# Patient Record
Sex: MALE | Race: WHITE
[De-identification: names, ages, dates, MRNs, and addresses within clinical notes are randomized per-mention and may not be internally consistent; named-entity substitution may affect disease eponyms.]

---

## 2018-03-17 ENCOUNTER — HOSPITAL ENCOUNTER (EMERGENCY)
Dept: HOSPITAL 17 - PHEFT | Age: 63
Discharge: HOME | End: 2018-03-17
Payer: COMMERCIAL

## 2018-03-17 VITALS — BODY MASS INDEX: 29.4 KG/M2 | WEIGHT: 194.01 LBS | HEIGHT: 68 IN

## 2018-03-17 VITALS
DIASTOLIC BLOOD PRESSURE: 74 MMHG | TEMPERATURE: 98.5 F | SYSTOLIC BLOOD PRESSURE: 141 MMHG | RESPIRATION RATE: 16 BRPM | HEART RATE: 75 BPM | OXYGEN SATURATION: 97 %

## 2018-03-17 DIAGNOSIS — F02.80: ICD-10-CM

## 2018-03-17 DIAGNOSIS — L03.012: Primary | ICD-10-CM

## 2018-03-17 DIAGNOSIS — E11.9: ICD-10-CM

## 2018-03-17 DIAGNOSIS — G31.09: ICD-10-CM

## 2018-03-17 PROCEDURE — 87205 SMEAR GRAM STAIN: CPT

## 2018-03-17 PROCEDURE — 87070 CULTURE OTHR SPECIMN AEROBIC: CPT

## 2018-03-17 PROCEDURE — 10060 I&D ABSCESS SIMPLE/SINGLE: CPT

## 2018-03-17 NOTE — PD
HPI


Chief Complaint:  Skin Problem


Time Seen by Provider:  13:35


Travel History


International Travel<30 days:  No


Contact w/Intl Traveler<30days:  No


Traveled to known affect area:  No





History of Present Illness


HPI


62-year-old male with history of early-onset dementia, frontal temporal lobe 

dementia presents to the emergency department with his wife and son at bedside 

for evaluation of infection to the left second finger.  He states he noticed 

this this morning.  He is not currently on antibiotics.  The patient does not 

contribute to history and physical due to his dementia history.  They state 

that he is at his baseline.  The patient is sleeping on my exam.  He does not 

act like he is in pain when I touch the finger.  Moderate severity.





PFSH


Past Medical History


Diabetes:  Yes


Respiratory:  Yes (COPD)


Pregnant?:  Not Pregnant





Social History


Alcohol Use:  No


Tobacco Use:  No


Substance Use:  No





Allergies-Medications


(Allergen,Severity, Reaction):  


Coded Allergies:  


     hazelnut (Verified  Allergy, Severe, Anaphylaxis, 3/17/18)


Reported Meds & Prescriptions





Reported Meds & Active Scripts


Active


Keflex (Cephalexin) 500 Mg Capsule 500 Mg PO Q6H 10 Days


Bactrim DS (Sulfamethoxazole-Trimethoprim) 800-160 Mg Tab 1 Tab PO BID








Review of Systems


Except as stated in HPI:  all other systems reviewed are Neg





Physical Exam


Narrative


GENERAL: Well-nourished, well-developed male patient, afebrile.


SKIN: Focused skin assessment warm/dry.  Patient has large paronychia to the 

medial aspect of the left second nail.  He does have a small red streak that 

goes FDC down the left hand.


HEAD: Normocephalic.  Atraumatic.


EYES: No scleral icterus. No injection or drainage. 


NECK: Supple, trachea midline. No JVD or lymphadenopathy.


CARDIOVASCULAR: Regular rate and rhythm without murmurs, gallops, or rubs. 


RESPIRATORY: Breath sounds equal bilaterally. No accessory muscle use.  Lungs 

sounds are clear to auscultation.


GASTROINTESTINAL: Abdomen soft, non-tender, nondistended. 


MUSCULOSKELETAL: No cyanosis, or edema.





Data


Data


Last Documented VS





Vital Signs








  Date Time  Temp Pulse Resp B/P (MAP) Pulse Ox O2 Delivery O2 Flow Rate FiO2


 


3/17/18 13:08 98.5 75 16 141/74 (96) 97   








Orders





 Orders


Wound Culture And Gram Stain (3/17/18 13:42)


Sulfamet-Trimeth Ds 800-160 Mg (Bactrim (3/17/18 14:00)


Cephalexin (Keflex) (3/17/18 14:00)








[facility]


Medical Decision Making


Medical Screen Exam Complete:  Yes


Emergency Medical Condition:  Yes


Medical Record Reviewed:  Yes


Differential Diagnosis


Paronychia versus felon versus cellulitis


Narrative Course


52-year-old male presents to the emergency department with his wife and son at 

bedside for evaluation of a left second finger infection.  On exam, the patient 

has a large paronychia.  I discussed doing a digital block versus opening up 

the area.  His family agrees to just open the paronychia.  Wound culture is 

taken.  Patient is given first dose of Bactrim and Keflex.  His family is 

instructed on proper wound care.  They are to return for any acute worsening of 

symptoms, worsening infection.





The patient was discharged in stable condition with instructions, including 

return instructions and follow up instructions.





Procedures


**Procedure Narrative**


INCISION AND DRAINAGE OF ABSCESS: The area was prepped and was sterilely 

draped. A number 11 scalpel was used to make a 0.5 -cm incision across the area 

of the abscess. Cultures were obtained. The abscess was drained an irrigated 

with normal saline. Sterile dressing applied.





Diagnosis





 Primary Impression:  


 Paronychia of finger of left hand


Referrals:  


Primary Care Physician


call for appointment


Patient Instructions:  General Instructions, Paronychia (ED)





***Additional Instructions:  


Clean twice daily with soap and water and apply over-the-counter antibiotic 

ointment.


Keep clean and dry.


Take antibiotics as directed until gone.


Follow-up with your primary care physician.


Return to the emergency department for any acute worsening of symptoms.


***Med/Other Pt SpecificInfo:  Prescription(s) given


Scripts


Cephalexin (Keflex) 500 Mg Capsule


500 MG PO Q6H for Infection for 10 Days, #40 CAP 0 Refills


   Prov: Mary Hall         3/17/18 


Sulfamethoxazole-Trimethoprim (Bactrim DS) 800-160 Mg Tab


1 TAB PO BID for Infection, #20 TAB 0 Refills


   Prov: Mary Hall         3/17/18


Disposition:  01 DISCHARGE HOME


Condition:  Stable











Mary Hall Mar 17, 2018 13:54